# Patient Record
Sex: MALE | Race: NATIVE HAWAIIAN OR OTHER PACIFIC ISLANDER
[De-identification: names, ages, dates, MRNs, and addresses within clinical notes are randomized per-mention and may not be internally consistent; named-entity substitution may affect disease eponyms.]

---

## 2023-02-09 ENCOUNTER — HOSPITAL ENCOUNTER (OUTPATIENT)
Dept: HOSPITAL 75 - RAD | Age: 58
End: 2023-02-09
Attending: INTERNAL MEDICINE
Payer: COMMERCIAL

## 2023-02-09 DIAGNOSIS — M48.07: ICD-10-CM

## 2023-02-09 DIAGNOSIS — M51.26: ICD-10-CM

## 2023-02-09 DIAGNOSIS — M51.36: ICD-10-CM

## 2023-02-09 DIAGNOSIS — M48.061: ICD-10-CM

## 2023-02-09 DIAGNOSIS — N28.1: ICD-10-CM

## 2023-02-09 DIAGNOSIS — M47.817: ICD-10-CM

## 2023-02-09 DIAGNOSIS — M47.816: Primary | ICD-10-CM

## 2023-02-09 PROCEDURE — 74019 RADEX ABDOMEN 2 VIEWS: CPT

## 2023-02-09 PROCEDURE — 72148 MRI LUMBAR SPINE W/O DYE: CPT

## 2023-02-09 NOTE — DIAGNOSTIC IMAGING REPORT
Clinical indications: Patient with chronic low back pain.



EXAM: MRI of the lumbar spine performed without IV contrast.

Sequences include sagittal T2, sagittal T1, sagittal T2 fat-sat,

sagittal STIR, coronal T2, and axial T2.





COMPARISON: None.



FINDINGS:

There is no acute lumbar spine fracture or dislocation. There is

minimal Modic type II degenerative signal changes anteriorly

throughout the lumbar spine with associated degenerative spurs.



The visualized portions of the distal thoracic spinal cord, conus

medullaris, and cauda equina nerve roots are unremarkable. The

conus medullaris tip is seen at the T12-L1 intervertebral level.

Is no significant paraspinal soft tissue abnormality. There is a

3.4 cm cyst involving the left kidney.



There are mild hypertrophic spurs involving the lumbar spine and

lower lumbar spine facet arthropathy.



L1-L2: Unremarkable.



L2-L3: There is  mild disk bulging. There is mild bilateral facet

arthropathy. There is no significant central canal or neural

foramen narrowing.



L3-L4: There is mild diffuse disk bulge. There is bilateral facet

arthropathy. There is no significant central canal or neural

foramen narrowing.



L4-L5: There is diffuse disk bulge and mild loss of disk space

height, hypertrophic far lateral disk spurs. There is severe

right facet arthropathy and moderate left facet arthropathy.

There is no significant central canal stenosis. There is moderate

right neural foramen narrowing. There is no significant left

neural foramen narrowing.



L5-S1: There is severe left facet arthropathy/hypertrophy. There

is mild right facet arthropathy. There is severe left facet

arthropathy/hypertrophy. There is moderate right facet

arthropathy. There is mild to moderate left neural foramen

narrowing. There is no significant central canal stenosis.



IMPRESSION:

1: There is no acute lumbar spine fracture or dislocation.



2: There is  multilevel lumbar spine degenerative disease, as

described above.



3: There is a left renal cyst.



Dictated by: 



  Dictated on workstation # [id number]

## 2023-02-09 NOTE — DIAGNOSTIC IMAGING REPORT
Indication: Evaluate for metal in the abdomen prior to MRI.



Time of Exam: 8:23 AM



There is a  metal clip identified right para midline pelvis.

Bowel gas pattern is unremarkable. No other radiopaque foreign

objects are seen.



IMPRESSION: There appears to be a metal clip in the right para

midline pelvis. No other abnormalities are detected.



Dictated by: 



  Dictated on workstation # [id number]